# Patient Record
Sex: MALE | Race: WHITE
[De-identification: names, ages, dates, MRNs, and addresses within clinical notes are randomized per-mention and may not be internally consistent; named-entity substitution may affect disease eponyms.]

---

## 2017-12-18 ENCOUNTER — HOSPITAL ENCOUNTER (OUTPATIENT)
Dept: HOSPITAL 92 - MRI | Age: 47
Discharge: HOME | End: 2017-12-18
Attending: ORTHOPAEDIC SURGERY
Payer: COMMERCIAL

## 2017-12-18 DIAGNOSIS — M25.511: Primary | ICD-10-CM

## 2017-12-18 DIAGNOSIS — S46.811A: ICD-10-CM
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
## 2018-01-11 ENCOUNTER — HOSPITAL ENCOUNTER (OUTPATIENT)
Dept: HOSPITAL 92 - LABBT | Age: 48
Discharge: HOME | End: 2018-01-11
Attending: ORTHOPAEDIC SURGERY
Payer: COMMERCIAL

## 2018-01-11 DIAGNOSIS — M19.011: ICD-10-CM

## 2018-01-11 DIAGNOSIS — M75.101: ICD-10-CM

## 2018-01-11 DIAGNOSIS — Z01.812: Primary | ICD-10-CM

## 2018-01-11 LAB
ANION GAP SERPL CALC-SCNC: 11 MMOL/L (ref 10–20)
BASOPHILS # BLD AUTO: 0 THOU/UL (ref 0–0.2)
BASOPHILS NFR BLD AUTO: 0.6 % (ref 0–1)
BUN SERPL-MCNC: 19 MG/DL (ref 8.9–20.6)
CALCIUM SERPL-MCNC: 9.4 MG/DL (ref 7.8–10.44)
CHLORIDE SERPL-SCNC: 104 MMOL/L (ref 98–107)
CO2 SERPL-SCNC: 26 MMOL/L (ref 22–29)
CREAT CL PREDICTED SERPL C-G-VRATE: 0 ML/MIN (ref 70–130)
EOSINOPHIL # BLD AUTO: 0.1 THOU/UL (ref 0–0.7)
EOSINOPHIL NFR BLD AUTO: 1.3 % (ref 0–10)
GLUCOSE SERPL-MCNC: 87 MG/DL (ref 70–105)
HGB BLD-MCNC: 14.9 G/DL (ref 14–18)
LYMPHOCYTES # BLD: 1.8 THOU/UL (ref 1.2–3.4)
LYMPHOCYTES NFR BLD AUTO: 26.7 % (ref 21–51)
MCH RBC QN AUTO: 29.8 PG (ref 27–31)
MCV RBC AUTO: 88.2 FL (ref 80–94)
MONOCYTES # BLD AUTO: 0.5 THOU/UL (ref 0.11–0.59)
MONOCYTES NFR BLD AUTO: 7.8 % (ref 0–10)
NEUTROPHILS # BLD AUTO: 4.4 THOU/UL (ref 1.4–6.5)
NEUTROPHILS NFR BLD AUTO: 63.7 % (ref 42–75)
PLATELET # BLD AUTO: 234 THOU/UL (ref 130–400)
POTASSIUM SERPL-SCNC: 3.9 MMOL/L (ref 3.5–5.1)
RBC # BLD AUTO: 4.99 MILL/UL (ref 4.7–6.1)
SODIUM SERPL-SCNC: 137 MMOL/L (ref 136–145)
WBC # BLD AUTO: 6.9 THOU/UL (ref 4.8–10.8)

## 2018-01-11 PROCEDURE — 80048 BASIC METABOLIC PNL TOTAL CA: CPT

## 2018-01-11 PROCEDURE — 85025 COMPLETE CBC W/AUTO DIFF WBC: CPT

## 2018-01-19 ENCOUNTER — HOSPITAL ENCOUNTER (OUTPATIENT)
Dept: HOSPITAL 92 - SDC | Age: 48
Discharge: HOME | End: 2018-01-19
Attending: ORTHOPAEDIC SURGERY
Payer: COMMERCIAL

## 2018-01-19 VITALS — BODY MASS INDEX: 33.3 KG/M2

## 2018-01-19 DIAGNOSIS — M19.011: ICD-10-CM

## 2018-01-19 DIAGNOSIS — Z90.49: ICD-10-CM

## 2018-01-19 DIAGNOSIS — M75.21: Primary | ICD-10-CM

## 2018-01-19 DIAGNOSIS — M75.41: ICD-10-CM

## 2018-01-19 PROCEDURE — A4306 DRUG DELIVERY SYSTEM <=50 ML: HCPCS

## 2018-01-19 PROCEDURE — C1713 ANCHOR/SCREW BN/BN,TIS/BN: HCPCS
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
## 2019-04-02 ENCOUNTER — HOSPITAL ENCOUNTER (EMERGENCY)
Dept: HOSPITAL 92 - ERS | Age: 49
Discharge: HOME | End: 2019-04-02
Payer: COMMERCIAL

## 2019-04-02 DIAGNOSIS — M25.541: Primary | ICD-10-CM

## 2019-04-02 NOTE — RAD
RIGHT HAND THREE VIEWS:

 

History: Pain. 

 

Comparison: None. 

 

FINDINGS: 

There is multilevel mildly concentric joint space narrowing, worse in the distal interphalangeal join
t of the small finger. Mild degenerative disease of the thumb carpal metacarpal joint. No acute fract
ure or malalignment. Mild radial carpal joint space narrowing. 

 

IMPRESSION: 

Chronic findings. No acute abnormality. 

 

POS: C